# Patient Record
Sex: FEMALE | URBAN - METROPOLITAN AREA
[De-identification: names, ages, dates, MRNs, and addresses within clinical notes are randomized per-mention and may not be internally consistent; named-entity substitution may affect disease eponyms.]

---

## 2022-06-14 ENCOUNTER — APPOINTMENT (OUTPATIENT)
Dept: URBAN - METROPOLITAN AREA CLINIC 240 | Age: 22
Setting detail: DERMATOLOGY
End: 2022-06-14

## 2022-06-14 ENCOUNTER — APPOINTMENT (OUTPATIENT)
Dept: URBAN - METROPOLITAN AREA CLINIC 240 | Age: 22
Setting detail: DERMATOLOGY
End: 2022-06-20

## 2022-06-14 DIAGNOSIS — Z41.9 ENCOUNTER FOR PROCEDURE FOR PURPOSES OTHER THAN REMEDYING HEALTH STATE, UNSPECIFIED: ICD-10-CM

## 2022-06-14 DIAGNOSIS — L81.8 OTHER SPECIFIED DISORDERS OF PIGMENTATION: ICD-10-CM

## 2022-06-14 DIAGNOSIS — L70.0 ACNE VULGARIS: ICD-10-CM

## 2022-06-14 PROCEDURE — OTHER ACNE SURGERY: OTHER

## 2022-06-14 PROCEDURE — OTHER LASER TATTOO REMOVAL: OTHER

## 2022-06-14 PROCEDURE — 10040 ACNE SURGERY: CPT

## 2022-06-14 ASSESSMENT — LOCATION SIMPLE DESCRIPTION DERM
LOCATION SIMPLE: LEFT RING FINGER
LOCATION SIMPLE: RIGHT RING FINGER
LOCATION SIMPLE: RIGHT FOREHEAD

## 2022-06-14 ASSESSMENT — LOCATION DETAILED DESCRIPTION DERM
LOCATION DETAILED: LEFT PROXIMAL DORSAL RING FINGER
LOCATION DETAILED: RIGHT INFERIOR MEDIAL FOREHEAD
LOCATION DETAILED: RIGHT PROXIMAL DORSAL RING FINGER

## 2022-06-14 ASSESSMENT — LOCATION ZONE DERM
LOCATION ZONE: FACE
LOCATION ZONE: FINGER

## 2022-06-14 NOTE — PROCEDURE: LASER TATTOO REMOVAL
Detail Level: Simple
Device Serial Number (Optional): Describe Patch
ANDRZEJ/urinary retention
Anesthesia Volume In Cc: 0
Spot Size In Mm: 2
Spot Size In Mm: 4
Pre-Procedure Text: Topical numbing 20mins, 1ml of buffered lido
Laser Type: Q-switched Janette 694nm
Laser Type: Q-switched Alexandrite 755nm
Anesthesia Type: 1% lidocaine with epinephrine
Post-Procedure Text: Vaseline Dressing. Tolerated well
Fluence: 6
Consent: Written consent obtained, risks reviewed including but not limited to crusting, scabbing, blistering, scarring, darker or lighter pigmentary change, systemic reactions, ulceration, incidental hair removal, bruising, and/or incomplete removal.
Tattoo Color Treated: Black
Endpoint Text: Immediate endpoint: skin whitening and pinpoint bleeding.
Post-Care Instructions: I reviewed with the patient in detail post-care instructions. Patient should apply vaseline twice daily to tattoo and keep it covered with a non-stick adhesive dressing.

## 2022-06-14 NOTE — PROCEDURE: ACNE SURGERY
Detail Level: Detailed
Prep Text (Optional): Prior to removal the treatment areas were prepped in the usual fashion.
Consent was obtained and risks were reviewed including but not limited to scarring, infection, bleeding, scabbing, incomplete removal, and allergy to anesthesia.
Post-Care Instructions: I reviewed with the patient in detail post-care instructions. Patient is to keep the treatment areas dry overnight, and then apply bacitracin twice daily until healed. Patient may apply hydrogen peroxide soaks to remove any crusting.
Extraction Method: 11 blade and comedo extractor
Acne Type: Comedonal Lesions
Render Post-Care Instructions In Note?: no

## 2022-08-12 ENCOUNTER — APPOINTMENT (OUTPATIENT)
Dept: URBAN - METROPOLITAN AREA CLINIC 240 | Age: 22
Setting detail: DERMATOLOGY
End: 2022-08-12

## 2022-08-12 ENCOUNTER — APPOINTMENT (OUTPATIENT)
Dept: URBAN - METROPOLITAN AREA CLINIC 240 | Age: 22
Setting detail: DERMATOLOGY
End: 2022-08-17

## 2022-08-12 DIAGNOSIS — L70.0 ACNE VULGARIS: ICD-10-CM

## 2022-08-12 DIAGNOSIS — L81.8 OTHER SPECIFIED DISORDERS OF PIGMENTATION: ICD-10-CM

## 2022-08-12 PROCEDURE — 10040 ACNE SURGERY: CPT

## 2022-08-12 PROCEDURE — OTHER LASER TATTOO REMOVAL: OTHER

## 2022-08-12 PROCEDURE — OTHER ACNE SURGERY: OTHER

## 2022-08-12 ASSESSMENT — LOCATION ZONE DERM
LOCATION ZONE: FACE
LOCATION ZONE: FINGER
LOCATION ZONE: LIP

## 2022-08-12 ASSESSMENT — LOCATION SIMPLE DESCRIPTION DERM
LOCATION SIMPLE: LEFT CHEEK
LOCATION SIMPLE: LEFT MIDDLE FINGER
LOCATION SIMPLE: RIGHT CHEEK
LOCATION SIMPLE: RIGHT LIP
LOCATION SIMPLE: RIGHT FOREHEAD
LOCATION SIMPLE: RIGHT MIDDLE FINGER

## 2022-08-12 ASSESSMENT — LOCATION DETAILED DESCRIPTION DERM
LOCATION DETAILED: RIGHT INFERIOR MEDIAL FOREHEAD
LOCATION DETAILED: LEFT INFERIOR CENTRAL MALAR CHEEK
LOCATION DETAILED: LEFT PROXIMAL DORSAL MIDDLE FINGER
LOCATION DETAILED: RIGHT INFERIOR CENTRAL MALAR CHEEK
LOCATION DETAILED: RIGHT PROXIMAL DORSAL MIDDLE FINGER
LOCATION DETAILED: RIGHT LOWER CUTANEOUS LIP

## 2022-08-12 NOTE — PROCEDURE: LASER TATTOO REMOVAL
Post-Care Instructions: I reviewed with the patient in detail post-care instructions. Patient should apply vaseline twice daily to tattoo and keep it covered with a non-stick adhesive dressing.

## 2022-08-12 NOTE — PROCEDURE: ACNE SURGERY
Consent was obtained and risks were reviewed including but not limited to scarring, infection, bleeding, scabbing, incomplete removal, and allergy to anesthesia.
Prep Text (Optional): Prior to removal the treatment areas were prepped in the usual fashion.
Detail Level: Detailed
Post-Care Instructions: I reviewed with the patient in detail post-care instructions. Patient is to keep the treatment areas dry overnight, and then apply bacitracin twice daily until healed. Patient may apply hydrogen peroxide soaks to remove any crusting.
Extraction Method: comedo extractor
Render Post-Care Instructions In Note?: no
Acne Type: Comedonal Lesions

## 2022-12-20 ENCOUNTER — APPOINTMENT (OUTPATIENT)
Dept: URBAN - METROPOLITAN AREA CLINIC 240 | Age: 22
Setting detail: DERMATOLOGY
End: 2022-12-20